# Patient Record
Sex: MALE | Race: WHITE | Employment: FULL TIME | URBAN - METROPOLITAN AREA
[De-identification: names, ages, dates, MRNs, and addresses within clinical notes are randomized per-mention and may not be internally consistent; named-entity substitution may affect disease eponyms.]

---

## 2021-07-28 ENCOUNTER — HOSPITAL ENCOUNTER (EMERGENCY)
Facility: CLINIC | Age: 36
Discharge: HOME | End: 2021-07-28
Attending: PHYSICIAN ASSISTANT | Admitting: PHYSICIAN ASSISTANT
Payer: OTHER GOVERNMENT

## 2021-07-28 VITALS
SYSTOLIC BLOOD PRESSURE: 164 MMHG | HEART RATE: 76 BPM | OXYGEN SATURATION: 99 % | TEMPERATURE: 97.2 F | RESPIRATION RATE: 16 BRPM | DIASTOLIC BLOOD PRESSURE: 107 MMHG

## 2021-07-28 DIAGNOSIS — S60.459A FOREIGN BODY OF FINGER: ICD-10-CM

## 2021-07-28 PROCEDURE — 90715 TDAP VACCINE 7 YRS/> IM: CPT | Performed by: PHYSICIAN ASSISTANT

## 2021-07-28 PROCEDURE — 250N000011 HC RX IP 250 OP 636: Performed by: PHYSICIAN ASSISTANT

## 2021-07-28 PROCEDURE — 90471 IMMUNIZATION ADMIN: CPT | Performed by: PHYSICIAN ASSISTANT

## 2021-07-28 PROCEDURE — 99283 EMERGENCY DEPT VISIT LOW MDM: CPT | Mod: 25

## 2021-07-28 RX ORDER — LIDOCAINE HYDROCHLORIDE 20 MG/ML
JELLY TOPICAL
Status: DISCONTINUED
Start: 2021-07-28 | End: 2021-07-28 | Stop reason: HOSPADM

## 2021-07-28 RX ADMIN — CLOSTRIDIUM TETANI TOXOID ANTIGEN (FORMALDEHYDE INACTIVATED), CORYNEBACTERIUM DIPHTHERIAE TOXOID ANTIGEN (FORMALDEHYDE INACTIVATED), BORDETELLA PERTUSSIS TOXOID ANTIGEN (GLUTARALDEHYDE INACTIVATED), BORDETELLA PERTUSSIS FILAMENTOUS HEMAGGLUTININ ANTIGEN (FORMALDEHYDE INACTIVATED), BORDETELLA PERTUSSIS PERTACTIN ANTIGEN, AND BORDETELLA PERTUSSIS FIMBRIAE 2/3 ANTIGEN 0.5 ML: 5; 2; 2.5; 5; 3; 5 INJECTION, SUSPENSION INTRAMUSCULAR at 23:06

## 2021-07-29 ASSESSMENT — ENCOUNTER SYMPTOMS
COLOR CHANGE: 0
WOUND: 0
MYALGIAS: 1

## 2021-07-29 NOTE — ED PROVIDER NOTES
History   Chief Complaint:  Ring stuck on finger       The history is provided by the patient.      Kale Odom is a right-handed 36 year old male who presents with a stainless steel metal ring stuck on his left index finger. He reports it being stuck for the last two days, with the finger being swollen. The patient was seen at Urgent Care and they were unable to remove the ring, so he was referred to the ED.     Review of Systems   Musculoskeletal: Positive for myalgias.   Skin: Negative for color change and wound.        (+) swelling, left index finger   All other systems reviewed and are negative.    Allergies:  The patient has no known allergies.     Medications:  The patient is currently on no regular medications.    Past Medical History:    History reviewed. No past medical history listed or noted by the patient.     Social History:  The patient presents alone.    Physical Exam     Patient Vitals for the past 24 hrs:   BP Temp Temp src Pulse Resp SpO2   07/28/21 2100 (!) 164/107 97.2  F (36.2  C) Temporal 76 16 99 %     Physical Exam  Vitals reviewed.   Constitutional:       General: He is not in acute distress.     Appearance: He is not diaphoretic.   HENT:      Head: Normocephalic and atraumatic.   Eyes:      General: No scleral icterus.  Cardiovascular:      Rate and Rhythm: Normal rate.      Pulses: Normal pulses.   Pulmonary:      Effort: Pulmonary effort is normal. No respiratory distress.   Musculoskeletal:         General: No tenderness.      Comments: Swelling of the L index finger. Ring noted to the proximal phalanx   Skin:     General: Skin is warm.      Findings: No rash.   Neurological:      Mental Status: He is alert.         Emergency Department [facility]    Foreign Body Removal    Date/Time: 7/29/2021 1:38 AM  Performed by: Porfirio Cruz PA-C  Authorized by: Porfirio Cruz PA-C       LOCATION     Location:  Finger    Finger  location:  L index finger    Depth: external.    Tendon involvement:  None      PRE-PROCEDURE DETAILS     Imaging:  None    Neurovascular status: intact    ANESTHESIA (see MAR for exact dosages)     Anesthesia method:  Topical application    Topical anesthetic:  Lidocaine gel  PROCEDURE TYPE     Procedure complexity:  Complex      PROCEDURE Describe Procedure: Ring cutters were initially utilized by support staff with removal of a portion of the ring. The ring was unable to be removed. Plyers and a wrench were attempted to open the ring and remove it. This was not tolerated well by the patient. The ring cutter was again utilized with progress being made to cut through the band. While positioning the ring cutter the patient's palm was bent and when the ring cutter motor was initiated a superficial 2cm laceration was inflicted of the L palmar surface.         Emergency Department Course:    Reviewed:  I reviewed nursing notes, vitals, and past medical history    Assessments:  2156 I obtained history and examined the patient as noted above.   2235 I rechecked the patient and attempted to remove the ring. At this point I feel that the patient is safe for discharge after a tetanus vaccine, and the patient agrees.     Interventions:  2306 Tdap 0.5 mL IM    Disposition:  The patient was discharged to home.       Impression & Plan     Medical Decision Making:  No neurovascular deficits or evidence of tendon njury. Ring cutter was utilized initially by support staff without successful removal of the ring. Approximately 7mm was removed of the band. Ice and lidocaine were attempted to assist with pain control while plyers and a wrench were attempted to expand the ring and remove it from the finger. This was unsuccessful. Forceps were used to slide under the sharp edges of the ring with attempted expansion of the ring unsuccesfully due to a lack of patient tolerance. Ring cutter was again attempted however an accidental  laceration was inflicted due to the proximity of the blade edge next to the palmar surface of the hand. Patient became upset about the lack of success with removal. Educated on limited physical resources for removal of metallic objects. He chose to leave the treatment area.        Diagnosis:    ICD-10-CM    1. Foreign body of finger  S60.459A        Scribe Disclosure:  I, Mey [place], am serving as a scribe at 10:03 PM on 7/28/2021 to document services personally performed by Porfirio Cruz PA-C based on my observations and the provider's statements to me.        Porfirio Cruz PA-C  07/29/21 0142

## 2021-07-29 NOTE — ED TRIAGE NOTES
Patient reports right stuck on left forefinger since last night. Seen at Urgent care and they were unable to remove it, referred to ED.

## 2021-11-03 ENCOUNTER — ANCILLARY PROCEDURE (OUTPATIENT)
Dept: GENERAL RADIOLOGY | Facility: CLINIC | Age: 36
End: 2021-11-03
Attending: FAMILY MEDICINE
Payer: COMMERCIAL

## 2021-11-03 ENCOUNTER — OFFICE VISIT (OUTPATIENT)
Dept: URGENT CARE | Facility: URGENT CARE | Age: 36
End: 2021-11-03
Payer: COMMERCIAL

## 2021-11-03 VITALS
SYSTOLIC BLOOD PRESSURE: 142 MMHG | RESPIRATION RATE: 16 BRPM | OXYGEN SATURATION: 99 % | HEART RATE: 88 BPM | TEMPERATURE: 97.9 F | DIASTOLIC BLOOD PRESSURE: 89 MMHG

## 2021-11-03 DIAGNOSIS — M25.522 LEFT ELBOW PAIN: ICD-10-CM

## 2021-11-03 DIAGNOSIS — M25.522 LEFT ELBOW PAIN: Primary | ICD-10-CM

## 2021-11-03 PROCEDURE — 99203 OFFICE O/P NEW LOW 30 MIN: CPT | Performed by: FAMILY MEDICINE

## 2021-11-03 PROCEDURE — 73080 X-RAY EXAM OF ELBOW: CPT | Mod: LT | Performed by: RADIOLOGY

## 2021-11-03 RX ORDER — ALBUTEROL SULFATE 90 UG/1
2 AEROSOL, METERED RESPIRATORY (INHALATION)
COMMUNITY
Start: 2020-12-04

## 2021-11-03 RX ORDER — DEXTROAMPHETAMINE SACCHARATE, AMPHETAMINE ASPARTATE, DEXTROAMPHETAMINE SULFATE AND AMPHETAMINE SULFATE 7.5; 7.5; 7.5; 7.5 MG/1; MG/1; MG/1; MG/1
30 TABLET ORAL
COMMUNITY
Start: 2020-05-22

## 2021-11-03 RX ORDER — LAMOTRIGINE 150 MG/1
TABLET ORAL
COMMUNITY
Start: 2020-08-13

## 2021-11-03 NOTE — PROGRESS NOTES
Assessment & Plan     Left elbow pain    - XR Elbow Left G/E 3 Views; Future    Reassured xrays are negative.  Discussed RICE and NSAIDs.    Close Follow-up if no change or worsening sx prn.    Nany Moore MD  [facility]    Rhett Henley is a 36 year old who presents for the following health issues     HPI     Presents today to eval LEFT elbow pain after playing soccer on Monday and running into a pole unexpectedly while going after a ball at full speed-- over LEFT olecranon and just distal is swollen and bruised.  Normal ROM.    He has not been elevating.  Has tried ibuprofen with some mild relief.      Review of Systems   Constitutional, HEENT, cardiovascular, pulmonary, GI, , musculoskeletal, neuro, skin, endocrine and psych systems are negative, except as otherwise noted.      Objective    BP (!) 142/89   Pulse 88   Temp 97.9  F (36.6  C) (Tympanic)   Resp 16   SpO2 99%   There is no height or weight on file to calculate BMI.  Physical Exam   GENERAL: healthy, alert and no distress  EYES: Eyes grossly normal to inspection, PERRL and conjunctivae and sclerae normal  NECK: no adenopathy, no asymmetry, masses, or scars and thyroid normal to palpation  MS: no gross musculoskeletal defects noted, + swelling and ecchymosis over LEFT olecranon and down into forearm  SKIN: no suspicious lesions or rashes  NEURO: Normal strength and tone, mentation intact and speech normal  PSYCH: mentation appears normal, affect normal/bright    Xray - Reviewed and interpreted by me.  Negative for fracture.

## 2022-02-19 ENCOUNTER — HOSPITAL ENCOUNTER (EMERGENCY)
Facility: CLINIC | Age: 37
Discharge: HOME | End: 2022-02-19
Attending: PHYSICIAN ASSISTANT | Admitting: PHYSICIAN ASSISTANT
Payer: COMMERCIAL

## 2022-02-19 VITALS
RESPIRATION RATE: 16 BRPM | OXYGEN SATURATION: 99 % | TEMPERATURE: 97.9 F | HEART RATE: 74 BPM | SYSTOLIC BLOOD PRESSURE: 142 MMHG | DIASTOLIC BLOOD PRESSURE: 89 MMHG

## 2022-02-19 DIAGNOSIS — F41.0 PANIC ATTACK: ICD-10-CM

## 2022-02-19 DIAGNOSIS — R42 DIZZINESS: ICD-10-CM

## 2022-02-19 PROBLEM — F98.8 ADD (ATTENTION DEFICIT DISORDER): Status: ACTIVE | Noted: 2017-05-26

## 2022-02-19 PROBLEM — J45.20 MILD INTERMITTENT ASTHMA WITHOUT COMPLICATION: Status: ACTIVE | Noted: 2019-10-16

## 2022-02-19 PROBLEM — L30.9 ECZEMA: Status: ACTIVE | Noted: 2022-02-19

## 2022-02-19 PROBLEM — L42 PITYRIASIS ROSEA: Status: ACTIVE | Noted: 2022-02-19

## 2022-02-19 PROBLEM — Z79.899 CONTROLLED SUBSTANCE AGREEMENT SIGNED: Status: ACTIVE | Noted: 2017-05-26

## 2022-02-19 PROBLEM — F32.1 DEPRESSION, MAJOR, SINGLE EPISODE, MODERATE (H): Status: ACTIVE | Noted: 2017-11-20

## 2022-02-19 LAB
ANION GAP SERPL CALCULATED.3IONS-SCNC: 6 MMOL/L (ref 3–14)
BUN SERPL-MCNC: 17 MG/DL (ref 7–30)
CALCIUM SERPL-MCNC: 9.1 MG/DL (ref 8.5–10.1)
CHLORIDE BLD-SCNC: 106 MMOL/L (ref 94–109)
CO2 SERPL-SCNC: 27 MMOL/L (ref 20–32)
CREAT SERPL-MCNC: 1.26 MG/DL (ref 0.66–1.25)
ERYTHROCYTE [DISTWIDTH] IN BLOOD BY AUTOMATED COUNT: 11.9 % (ref 10–15)
GFR SERPL CREATININE-BSD FRML MDRD: 75 ML/MIN/1.73M2
GLUCOSE BLD-MCNC: 128 MG/DL (ref 70–99)
HCT VFR BLD AUTO: 42.6 % (ref 40–53)
HGB BLD-MCNC: 14.8 G/DL (ref 13.3–17.7)
MCH RBC QN AUTO: 31.4 PG (ref 26.5–33)
MCHC RBC AUTO-ENTMCNC: 34.7 G/DL (ref 31.5–36.5)
MCV RBC AUTO: 90 FL (ref 78–100)
PLATELET # BLD AUTO: 232 10E3/UL (ref 150–450)
POTASSIUM BLD-SCNC: 3.3 MMOL/L (ref 3.4–5.3)
RBC # BLD AUTO: 4.71 10E6/UL (ref 4.4–5.9)
SODIUM SERPL-SCNC: 139 MMOL/L (ref 133–144)
TROPONIN I SERPL HS-MCNC: 4 NG/L
WBC # BLD AUTO: 5.4 10E3/UL (ref 4–11)

## 2022-02-19 PROCEDURE — 80048 BASIC METABOLIC PNL TOTAL CA: CPT | Performed by: NURSE PRACTITIONER

## 2022-02-19 PROCEDURE — 99284 EMERGENCY DEPT VISIT MOD MDM: CPT | Mod: 25

## 2022-02-19 PROCEDURE — 96361 HYDRATE IV INFUSION ADD-ON: CPT

## 2022-02-19 PROCEDURE — 96360 HYDRATION IV INFUSION INIT: CPT

## 2022-02-19 PROCEDURE — 84484 ASSAY OF TROPONIN QUANT: CPT | Performed by: NURSE PRACTITIONER

## 2022-02-19 PROCEDURE — 85027 COMPLETE CBC AUTOMATED: CPT | Performed by: NURSE PRACTITIONER

## 2022-02-19 PROCEDURE — 258N000003 HC RX IP 258 OP 636: Performed by: PHYSICIAN ASSISTANT

## 2022-02-19 PROCEDURE — 36415 COLL VENOUS BLD VENIPUNCTURE: CPT | Performed by: NURSE PRACTITIONER

## 2022-02-19 PROCEDURE — 93005 ELECTROCARDIOGRAM TRACING: CPT

## 2022-02-19 RX ADMIN — SODIUM CHLORIDE 1000 ML: 9 INJECTION, SOLUTION INTRAVENOUS at 18:24

## 2022-02-19 ASSESSMENT — ENCOUNTER SYMPTOMS
NERVOUS/ANXIOUS: 1
PALPITATIONS: 0
LIGHT-HEADEDNESS: 1
SHORTNESS OF BREATH: 1
DIZZINESS: 1
CHEST TIGHTNESS: 1

## 2022-02-20 NOTE — DISCHARGE INSTRUCTIONS
Follow up closely with primary care provider for an ER followup appointment.   Recommended cutting back on caffeine and refrain from alcohol use.   Continue medications as prescribed.   Return for any new/worsening symptoms.

## 2022-02-20 NOTE — ED PROVIDER NOTES
History     Chief Complaint:  Dizziness    The history is provided by the patient and a significant other.      Kale Odom is a 37 year old male with a history of anxiety and ADD accompanied with his girlfriend who presents to the emergency department for evaluation of dizziness. The patient reported earlier this evening he was at [employer] with his girlfriend when he had a sudden onset of a flushed feeling then felt like he had to gasp for air. He reported he started feeling panicky and wondered if this may have contributed to his symptoms. He also reported dizziness and light-headed at that time.He had to sit down at that time, and focus on his breathing. He notes chest tightness, but denies chest pain, shortness of breath,or other concerns.  Currently, he reports feeling improved though notes intermittently feeling flushed. Kale also noted he drank alcohol yesterday and today while taking his adderall. Also took a 5 hour energy shot prior to [employer]. The sudden onset of his symptoms prompted him to seek evaluation in the emergency department.      The patient reported he had a previous episode of similar symptoms in 2020, and at that time he was evaluated at [facility] Emergency Department. He noted they did not identify any significant findings at that time.       Review of Systems   Respiratory: Positive for chest tightness and shortness of breath.    Cardiovascular: Negative for chest pain and palpitations.   Neurological: Positive for dizziness and light-headedness. Negative for syncope.   Psychiatric/Behavioral: The patient is nervous/anxious.    All other systems reviewed and are negative.    Allergies:  The patient has no know allergies on file.     Medications:    albuterol (PROAIR HFA/PROVENTIL HFA/VENTOLIN HFA) 108 (90 Base) MCG/ACT inhaler  amphetamine-dextroamphetamine (ADDERALL) 30 MG tablet  lamoTRIgine (LAMICTAL) 150 MG tablet    Past Medical History:    Mild intermittent asthma without  complication   Depression, major, single episode, moderate   ADD (attention deficit disorder)   Controlled substance agreement signed   Anxiety ([facility])  Eczema   Pityriasis rosea    Past Surgical History:    [place] teeth extraction     Social History:   Presents with girlfriend. He reports alcohol use.   Physical Exam     Patient Vitals for the past 24 hrs:   BP Temp Temp src Pulse Resp SpO2   02/19/22 2000 -- -- -- 74 16 --   02/19/22 1945 (!) 142/89 -- -- -- -- --   02/19/22 1900 (!) 151/87 -- -- 76 17 99 %   02/19/22 1845 139/76 -- -- 92 17 97 %   02/19/22 1830 138/76 -- -- 92 20 98 %   02/19/22 1737 (!) 172/109 97.9  F (36.6  C) Temporal 93 20 100 %     Physical Exam  General: Alert, cooperative.   Head:  Scalp is atraumatic.  Eyes:  The pupils are equal, round, and reactive to light. Normal conjunctiva.   ENT:                                      Ears:  The external ears are normal.   Nose:  The external nose is normal.  Throat:  The oropharynx is normal. Mucus membranes are moist.                 Neck:  Normal range of motion.   CV:  Tachycardic rate. No murmur. 2+ radial pulses  Resp:  Breath sounds are clear bilaterally. Non-labored, no retractions or accessory muscle use.  GI:  Abdomen is soft, no distension, no tenderness.   MS:  Normal range of motion. No acute deformities.   Skin:  Warm and dry. No rash.   Neuro:  Alert. Strength and sensation grossly intact.   Psych:  Awake. Alert.  Appropriate interactions.     Emergency Department Course   ECG:  ECG taken at 1745, ECG read at 1749  Normal sinus rhythm    Normal ECG   Rate 95 bpm. NM interval 158 ms. QRS duration 92 ms. QT/QTc 370/464 ms. P-R-T axes 57 81 26.     Laboratory:  Labs Ordered and Resulted from Time of ED Arrival to Time of ED Departure   BASIC METABOLIC PANEL - Abnormal       Result Value    Sodium 139      Potassium 3.3 (*)     Chloride 106      Carbon Dioxide (CO2) 27      Anion Gap 6      Urea Nitrogen 17      Creatinine 1.26 (*)      Calcium 9.1      Glucose 128 (*)     GFR Estimate 75     CBC WITH PLATELETS - Normal    WBC Count 5.4      RBC Count 4.71      Hemoglobin 14.8      Hematocrit 42.6      MCV 90      MCH 31.4      MCHC 34.7      RDW 11.9      Platelet Count 232     TROPONIN I - Normal    Troponin I High Sensitivity 4       Emergency Department Course:    Reviewed:  I reviewed nursing notes, vitals, past history and care everywhere    Assessments:  1810 I obtained history and examined the patient as noted above.   1948 I rechecked the patient and explained findings.     Interventions:  1824  0.9% sodium chloride BOLUS 1,000 mL, IV     Disposition:  The patient was discharged to home.    Impression & Plan      Medical Decision Making:  Kale Odom is a 37 year old male presents emergency department after having an episode of dizziness while at [employer].  Patient has a history of anxiety and I do suspect this contributed to symptoms.  Nonetheless, also considered life-threatening etiologies of symptoms.  He notes some chest tightness but really denies chest pain.  EKG without ischemic changes and screening troponin negative.  No suspicion for acute coronary syndrome.  Patient is PERC negative and there is no pleuritic chest pain or hypoxia to suggest pulmonary embolism.  Clinically not consistent with aortic dissection, pneumonia, pneumothorax, or other acute cardiopulmonary etiologies.  Patient reports he drinks alcohol, took his Adderall, and drink a 5-hour energy shot prior to onset of symptoms. I suspect this combination contributed to symptoms and I recommended avoiding excessive caffeine while taking Adderall.  Patient feels improved here and I believe he is safe for discharge home.  I recommended following up with his primary care provider next week for recheck and return precautions discussed.  All questions and concerns addressed prior to discharge home.    Diagnosis:    ICD-10-CM    1. Dizziness  R42    2. Panic attack   F41.0      Discharge Medications:  Discharge Medication List as of 2/19/2022  7:57 PM        Scribe Disclosure:  I, Lien Reyes, am serving as a scribe at 6:10 PM on 2/19/2022 to document services personally performed by Kimberly Redmond PA-C based on my observations and the provider's statements to me.      Kimberly Redmond PA-C  02/19/22 0534

## 2022-02-21 LAB
ATRIAL RATE - MUSE: 95 BPM
DIASTOLIC BLOOD PRESSURE - MUSE: NORMAL MMHG
INTERPRETATION ECG - MUSE: NORMAL
P AXIS - MUSE: 57 DEGREES
PR INTERVAL - MUSE: 158 MS
QRS DURATION - MUSE: 92 MS
QT - MUSE: 370 MS
QTC - MUSE: 464 MS
R AXIS - MUSE: 81 DEGREES
SYSTOLIC BLOOD PRESSURE - MUSE: NORMAL MMHG
T AXIS - MUSE: 26 DEGREES
VENTRICULAR RATE- MUSE: 95 BPM